# Patient Record
Sex: FEMALE | Race: WHITE | NOT HISPANIC OR LATINO | Employment: FULL TIME | URBAN - METROPOLITAN AREA
[De-identification: names, ages, dates, MRNs, and addresses within clinical notes are randomized per-mention and may not be internally consistent; named-entity substitution may affect disease eponyms.]

---

## 2022-09-17 ENCOUNTER — HOSPITAL ENCOUNTER (EMERGENCY)
Facility: CLINIC | Age: 62
Discharge: HOME | End: 2022-09-17
Attending: FAMILY MEDICINE | Admitting: FAMILY MEDICINE
Payer: COMMERCIAL

## 2022-09-17 ENCOUNTER — APPOINTMENT (OUTPATIENT)
Dept: GENERAL RADIOLOGY | Facility: CLINIC | Age: 62
End: 2022-09-17
Attending: FAMILY MEDICINE
Payer: COMMERCIAL

## 2022-09-17 ENCOUNTER — ANESTHESIA (OUTPATIENT)
Dept: EMERGENCY MEDICINE | Facility: CLINIC | Age: 62
End: 2022-09-17
Payer: COMMERCIAL

## 2022-09-17 ENCOUNTER — ANESTHESIA EVENT (OUTPATIENT)
Dept: EMERGENCY MEDICINE | Facility: CLINIC | Age: 62
End: 2022-09-17
Payer: COMMERCIAL

## 2022-09-17 VITALS
HEART RATE: 68 BPM | SYSTOLIC BLOOD PRESSURE: 145 MMHG | HEIGHT: 65 IN | RESPIRATION RATE: 23 BRPM | DIASTOLIC BLOOD PRESSURE: 88 MMHG | TEMPERATURE: 97.8 F | BODY MASS INDEX: 45.82 KG/M2 | OXYGEN SATURATION: 98 % | WEIGHT: 275 LBS

## 2022-09-17 DIAGNOSIS — S53.105A DISLOCATION OF LEFT ELBOW, INITIAL ENCOUNTER: ICD-10-CM

## 2022-09-17 PROCEDURE — 24600 TX CLSD ELBOW DISLC W/O ANES: CPT | Performed by: FAMILY MEDICINE

## 2022-09-17 PROCEDURE — 99284 EMERGENCY DEPT VISIT MOD MDM: CPT | Mod: 57 | Performed by: FAMILY MEDICINE

## 2022-09-17 PROCEDURE — 96374 THER/PROPH/DIAG INJ IV PUSH: CPT | Performed by: FAMILY MEDICINE

## 2022-09-17 PROCEDURE — 96375 TX/PRO/DX INJ NEW DRUG ADDON: CPT | Performed by: FAMILY MEDICINE

## 2022-09-17 PROCEDURE — 250N000011 HC RX IP 250 OP 636: Performed by: FAMILY MEDICINE

## 2022-09-17 PROCEDURE — 250N000011 HC RX IP 250 OP 636: Performed by: NURSE ANESTHETIST, CERTIFIED REGISTERED

## 2022-09-17 PROCEDURE — 999N000065 XR ELBOW LEFT 2 VIEWS

## 2022-09-17 PROCEDURE — 73070 X-RAY EXAM OF ELBOW: CPT | Mod: LT

## 2022-09-17 PROCEDURE — 370N000003 HC ANESTHESIA WARD SERVICE

## 2022-09-17 PROCEDURE — 99285 EMERGENCY DEPT VISIT HI MDM: CPT | Mod: 25 | Performed by: FAMILY MEDICINE

## 2022-09-17 RX ORDER — PROPOFOL 10 MG/ML
INJECTION, EMULSION INTRAVENOUS PRN
Status: DISCONTINUED | OUTPATIENT
Start: 2022-09-17 | End: 2022-09-17

## 2022-09-17 RX ORDER — OXYCODONE HYDROCHLORIDE 5 MG/1
5-10 TABLET ORAL EVERY 8 HOURS PRN
Qty: 12 TABLET | Refills: 0 | Status: SHIPPED | OUTPATIENT
Start: 2022-09-17 | End: 2022-09-20

## 2022-09-17 RX ORDER — KETOROLAC TROMETHAMINE 15 MG/ML
15 INJECTION, SOLUTION INTRAMUSCULAR; INTRAVENOUS ONCE
Status: COMPLETED | OUTPATIENT
Start: 2022-09-17 | End: 2022-09-17

## 2022-09-17 RX ORDER — HYDROMORPHONE HYDROCHLORIDE 1 MG/ML
0.5 INJECTION, SOLUTION INTRAMUSCULAR; INTRAVENOUS; SUBCUTANEOUS
Status: DISCONTINUED | OUTPATIENT
Start: 2022-09-17 | End: 2022-09-17 | Stop reason: HOSPADM

## 2022-09-17 RX ADMIN — PROPOFOL 150 MG: 10 INJECTION, EMULSION INTRAVENOUS at 15:52

## 2022-09-17 RX ADMIN — PROPOFOL 70 MG: 10 INJECTION, EMULSION INTRAVENOUS at 15:53

## 2022-09-17 RX ADMIN — KETOROLAC TROMETHAMINE 15 MG: 15 INJECTION, SOLUTION INTRAMUSCULAR; INTRAVENOUS at 14:02

## 2022-09-17 RX ADMIN — HYDROMORPHONE HYDROCHLORIDE 0.5 MG: 1 INJECTION, SOLUTION INTRAMUSCULAR; INTRAVENOUS; SUBCUTANEOUS at 14:04

## 2022-09-17 ASSESSMENT — ACTIVITIES OF DAILY LIVING (ADL)
ADLS_ACUITY_SCORE: 35
ADLS_ACUITY_SCORE: 35

## 2022-09-17 NOTE — LETTER
September 17, 2022      To Whom It May Concern:      Karla Shelby was seen in our Emergency Department today, 09/17/22.  She has a medical problem that will prevent her from using her left arm at least over the next 2 weeks.  She will have orthopedic follow-up and further recommendations and restrictions will be given at that time.    Sincerely,        Sid Kong MD

## 2022-09-17 NOTE — DISCHARGE INSTRUCTIONS
RETURN TO THE EMERGENCY ROOM FOR THE FOLLOWING:    Severely worsened pain, recurrent dislocation, or at anytime for any concern.    FOLLOW UP:    Orthopedic referral order placed at the time of your discharge.  Expect a phone call within the next 1-2 business days to help with scheduling.    TREATMENT RECOMMENDATIONS:    Ibuprofen 600 mg every six hours for pain (7 days duration).  Tylenol 1000 mg every six hours for pain (7 days duration).  Therefore, you can alternate these every three hours and do it safely.  Oxycodone 1-2 tablets every six hours as needed for pain control that is not relieved by the above.  MiraLax OTC.  Titrate the medicine to achieve a soft, easy stool every 1-2 days.    Keep the sling on at all times until seen by orthopedics.    NURSE ADVICE LINE:  [phone] or [phone]

## 2022-09-17 NOTE — ANESTHESIA CARE TRANSFER NOTE
Patient: Karla Shelby    Procedure: * No procedures listed *       Diagnosis: * No pre-op diagnosis entered *  Diagnosis Additional Information: No value filed.    Anesthesia Type:   General     Note:    Oropharynx: oropharynx clear of all foreign objects and spontaneously breathing  Level of Consciousness: awake  Oxygen Supplementation: nasal cannula  Level of Supplemental Oxygen (L/min / FiO2): 2  Independent Airway: airway patency satisfactory and stable  Dentition: dentition unchanged  Vital Signs Stable: post-procedure vital signs reviewed and stable  Report to RN Given: handoff report given  Patient transferred to: Emergency Department    Handoff Report: Identifed the Patient, Identified the Reponsible Provider, Reviewed the pertinent medical history, Discussed the surgical course, Reviewed Intra-OP anesthesia mangement and issues during anesthesia, Set expectations for post-procedure period and Allowed opportunity for questions and acknowledgement of understanding      Vitals:  Vitals Value Taken Time   /70 09/17/22 1605   Temp     Pulse 74 09/17/22 1610   Resp 22 09/17/22 1610   SpO2 100 % 09/17/22 1610   Vitals shown include unvalidated device data.    Electronically Signed By: ANIBAL Javier CRNA  September 17, 2022  4:11 PM

## 2022-09-17 NOTE — ED PROVIDER NOTES
"  HPI   The patient is a 62-year-old female presenting with an injury to her left elbow.  This occurred just prior to arrival.  The patient was walking into a  home when she tripped on the lip of the doorway.  Her left hand was holding the vertical panel of the doorway.  As she tripped she fell forward and her hand held on, causing the elbow to hyperextend.  She felt severe pain as this happened and then she curled and fell to the right side.  She denies other significant injury.  Her left elbow pain is severe.  Any attempted movement elicits worsened pain.  No head trauma.  No LOC.  No vomiting.        Allergies:  Allergies   Allergen Reactions     Amoxicillin      Penicillins      Sulfa Drugs      Problem List:    There are no problems to display for this patient.     Past Medical History:    No past medical history on file.  Past Surgical History:    No past surgical history on file.  Family History:    No family history on file.  Social History:  Marital Status:        Medications:    oxyCODONE (ROXICODONE) 5 MG tablet      Review of Systems   All other systems reviewed and are negative.      PE   BP: (!) 171/79  Pulse: 78  Temp: 97.8  F (36.6  C)  Resp: 19  Height: 165.1 cm (5' 5\")  Weight: 124.7 kg (275 lb)  SpO2: 98 %  Physical Exam  Vitals reviewed.   Constitutional:       General: She is not in acute distress.     Appearance: She is well-developed.   HENT:      Head: Normocephalic and atraumatic.      Right Ear: External ear normal.      Left Ear: External ear normal.      Nose: Nose normal.      Mouth/Throat:      Mouth: Mucous membranes are moist.      Pharynx: Oropharynx is clear.   Eyes:      Extraocular Movements: Extraocular movements intact.      Conjunctiva/sclera: Conjunctivae normal.      Pupils: Pupils are equal, round, and reactive to light.   Cardiovascular:      Rate and Rhythm: Normal rate and regular rhythm.   Pulmonary:      Effort: Pulmonary effort is normal.   Musculoskeletal:      " Cervical back: Normal range of motion.      Comments: Left elbow appears to be dislocated.  She has tenderness with any palpation at the elbow.  She will not let me move the elbow because of pain.  Her ulnar, radial, and median nerve motor function are intact at the wrist and hand.  The hand is warm to the touch.   Skin:     General: Skin is warm and dry.   Neurological:      Mental Status: She is alert and oriented to person, place, and time.   Psychiatric:         Behavior: Behavior normal.         ED COURSE and MDM   1359.  The patient has an elbow injury.  X-ray pending.  Dilaudid ordered.  Toradol ordered.    1751.  Repeat x-ray showing relocation of the dislocation.  Orthopedic follow-up referral order placed.  Sling.  Oxycodone, 12 tablets.    LABS  Labs Ordered and Resulted from Time of ED Arrival to Time of ED Departure - No data to display    IMAGING  Images reviewed by me.  Radiology report also reviewed.  Elbow XR, 2 view, left   Final Result   IMPRESSION: Interval reduction of the elbow dislocation seen on the old study. This is now in anatomic alignment. There is a small calcification along the lateral epicondyle which may represent an avulsive fracture of a portion of the common extensor    tendon attachment but this may be more chronic and secondary to old enthesitis.      Elbow XR, 2 view, left   Final Result   IMPRESSION: Elbow dislocation. The radius and ulna are radially displaced. Ulnar angulation at the elbow joint. Small fracture fragment superior to the radial head.             [facility]    -Dislocation - Upper Extremity    Date/Time: 9/17/2022 4:14 PM  Performed by: Sid Kong MD  Authorized by: Sid Kong MD     Risks, benefits and alternatives discussed.      LOCATION     Location:  Elbow    Elbow location:  L elbow    PRE PROCEDURE ASSESSMENT      Pre-procedure imaging:  X-ray    Imaging findings: dislocation present      Imaging findings: no  fracture      Distal perfusion: normal      ANESTHESIA (see MAR for exact dosages)      Anesthesia method: procedural sedation.    PROCEDURE DETAILS      Manipulation performed: yes      Elbow reduction method:  Traction and counter traction    Reduction successful: yes      Reduction confirmed with imaging: yes      Immobilization:  Sling    Supplies used:  Sling    POST PROCEDURE DETAILS     Neurological function: normal      Distal perfusion: normal      Range of motion: improved        PROCEDURE    Patient Tolerance:  Patient tolerated the procedure well with no immediate complications      Medications   HYDROmorphone (PF) (DILAUDID) injection 0.5 mg (0.5 mg Intravenous Given 9/17/22 1404)   ketorolac (TORADOL) injection 15 mg (15 mg Intravenous Given 9/17/22 1402)         IMPRESSION       ICD-10-CM    1. Dislocation of left elbow, initial encounter  S53.105A             Medication List      Started    oxyCODONE 5 MG tablet  Commonly known as: ROXICODONE  5-10 mg, Oral, EVERY 8 HOURS PRN                        Sid Kong MD  09/17/22 6850

## 2022-09-17 NOTE — ANESTHESIA PREPROCEDURE EVALUATION
Anesthesia Pre-Procedure Evaluation    Patient: Karla Shelby   MRN: [id number] : 1960        Procedure : * No procedures listed *          No past medical history on file.   No past surgical history on file.   Allergies   Allergen Reactions     Amoxicillin      Penicillins      Sulfa Drugs       Social History     Tobacco Use     Smoking status: Not on file     Smokeless tobacco: Not on file   Substance Use Topics     Alcohol use: Not on file      Wt Readings from Last 1 Encounters:   22 124.7 kg (275 lb)        Anesthesia Evaluation   Pt has had prior anesthetic. Type: General and MAC.    No history of anesthetic complications       ROS/MED HX  ENT/Pulmonary:     (+) CHEMO risk factors, snores loudly, obese,     Neurologic:  - neg neurologic ROS     Cardiovascular:       METS/Exercise Tolerance: 4 - Raking leaves, gardening    Hematologic:  - neg hematologic  ROS     Musculoskeletal:       GI/Hepatic:     (+) GERD, Asymptomatic on medication,     Renal/Genitourinary:       Endo:     (+) Obesity,     Psychiatric/Substance Use:     (+) psychiatric history anxiety     Infectious Disease:       Malignancy:       Other:            Physical Exam    Airway  airway exam normal      Mallampati: III   TM distance: > 3 FB   Neck ROM: full   Mouth opening: > 3 cm    Respiratory Devices and Support         Dental  no notable dental history         Cardiovascular   cardiovascular exam normal          Pulmonary   pulmonary exam normal                OUTSIDE LABS:  CBC: No results found for: WBC, HGB, HCT, PLT  BMP: No results found for: NA, POTASSIUM, CHLORIDE, CO2, BUN, CR, GLC  COAGS: No results found for: PTT, INR, FIBR  POC: No results found for: BGM, HCG, HCGS  HEPATIC: No results found for: ALBUMIN, PROTTOTAL, ALT, AST, GGT, ALKPHOS, BILITOTAL, BILIDIRECT, NATHAN  OTHER: No results found for: PH, LACT, A1C, GARDENIA, PHOS, MAG, LIPASE, AMYLASE, TSH, T4, T3, CRP, SED    Anesthesia Plan    ASA Status:  3       Anesthesia Type: General.     - Airway: Native airway   Induction: Propofol.           Consents    Anesthesia Plan(s) and associated risks, benefits, and realistic alternatives discussed. Questions answered and patient/representative(s) expressed understanding.     - Discussed: Risks, Benefits and Alternatives for BOTH SEDATION and the PROCEDURE were discussed     - Discussed with:  Patient      - Extended Intubation/Ventilatory Support Discussed: No.      - Patient is DNR/DNI Status: No    Use of blood products discussed: No .     Postoperative Care       PONV prophylaxis: Background Propofol Infusion     Comments:                ANIBAL Javier CRNA

## (undated) RX ORDER — PROPOFOL 10 MG/ML
INJECTION, EMULSION INTRAVENOUS
Status: DISPENSED
Start: 2022-09-17